# Patient Record
Sex: FEMALE | Race: WHITE | NOT HISPANIC OR LATINO
[De-identification: names, ages, dates, MRNs, and addresses within clinical notes are randomized per-mention and may not be internally consistent; named-entity substitution may affect disease eponyms.]

---

## 2019-10-30 ENCOUNTER — APPOINTMENT (OUTPATIENT)
Dept: ORTHOPEDIC SURGERY | Facility: CLINIC | Age: 54
End: 2019-10-30
Payer: COMMERCIAL

## 2019-10-30 VITALS — DIASTOLIC BLOOD PRESSURE: 81 MMHG | SYSTOLIC BLOOD PRESSURE: 132 MMHG | HEART RATE: 87 BPM

## 2019-10-30 VITALS — WEIGHT: 135 LBS | HEIGHT: 64 IN | BODY MASS INDEX: 23.05 KG/M2

## 2019-10-30 DIAGNOSIS — M75.82 OTHER SHOULDER LESIONS, LEFT SHOULDER: ICD-10-CM

## 2019-10-30 DIAGNOSIS — Z78.9 OTHER SPECIFIED HEALTH STATUS: ICD-10-CM

## 2019-10-30 PROCEDURE — 99203 OFFICE O/P NEW LOW 30 MIN: CPT

## 2019-10-30 PROCEDURE — 73030 X-RAY EXAM OF SHOULDER: CPT | Mod: LT

## 2019-10-30 NOTE — DISCUSSION/SUMMARY
[de-identified] : Discussion had with patient - will attempt conservative treatment \par Patient will follow up with sports medicine \par Patient aware must follow up with MD for further eval and treatment \par Patient will start Physical Therapy \par Patients questions were answered and patient is satisfied with today's visit\par

## 2019-10-30 NOTE — PHYSICAL EXAM
[UE/LE] : Sensory: Intact in bilateral upper & lower extremities [ALL] : dorsalis pedis, posterior tibial, femoral, popliteal, and radial 2+ and symmetric bilaterally [Normal] : Oriented to person, place, and time, insight and judgement were intact and the affect was normal [Poor Appearance] : well-appearing [Acute Distress] : not in acute distress [de-identified] : Skin Warm, dry, no erythema, no warmth, no swelling, no lesions\par \par Tenderness - posterior GH joint\par \par ROM \par Flexion -110 degrees active and passive\par Abduction 90 degrees active and passive \par \par provocative testing limited due to pain and Decreased ROM \par \par Sensation to touch intact to lateral to axillary and musculocutaneous nerve, distal motor function intact to elbow, wrist and hand \par pulse intact, cap refill intact\par  [de-identified] : 3 view left shoulder reveals no acute findings

## 2019-10-30 NOTE — HISTORY OF PRESENT ILLNESS
[Pain Location] : pain [de-identified] : Patient is a 54 year old female who presents c/o left shoulder pain.  patient states woke up on Saturday and was unable to lift arm.  patient states no injury that started pain. patient states was unable to lift arm upon waking. patient states started using naproxen with relief.  patient states ROM has improved however still decreased. patient states radiation down arm.

## 2019-11-03 PROBLEM — M75.82 TENDINITIS OF LEFT ROTATOR CUFF: Status: ACTIVE | Noted: 2019-10-30

## 2020-08-25 ENCOUNTER — OUTPATIENT (OUTPATIENT)
Dept: OUTPATIENT SERVICES | Facility: HOSPITAL | Age: 55
LOS: 1 days | End: 2020-08-25
Payer: COMMERCIAL

## 2020-08-25 ENCOUNTER — APPOINTMENT (OUTPATIENT)
Dept: ULTRASOUND IMAGING | Facility: IMAGING CENTER | Age: 55
End: 2020-08-25
Payer: COMMERCIAL

## 2020-08-25 ENCOUNTER — APPOINTMENT (OUTPATIENT)
Dept: ORTHOPEDIC SURGERY | Facility: CLINIC | Age: 55
End: 2020-08-25
Payer: COMMERCIAL

## 2020-08-25 VITALS — HEIGHT: 64 IN | BODY MASS INDEX: 23.56 KG/M2 | WEIGHT: 138 LBS

## 2020-08-25 DIAGNOSIS — M54.16 RADICULOPATHY, LUMBAR REGION: ICD-10-CM

## 2020-08-25 DIAGNOSIS — M79.605 PAIN IN LEFT LEG: ICD-10-CM

## 2020-08-25 DIAGNOSIS — M79.606 PAIN IN LEG, UNSPECIFIED: ICD-10-CM

## 2020-08-25 PROCEDURE — 93971 EXTREMITY STUDY: CPT

## 2020-08-25 PROCEDURE — 72100 X-RAY EXAM L-S SPINE 2/3 VWS: CPT

## 2020-08-25 PROCEDURE — 93971 EXTREMITY STUDY: CPT | Mod: 26,LT

## 2020-08-25 PROCEDURE — 99204 OFFICE O/P NEW MOD 45 MIN: CPT

## 2020-08-25 RX ORDER — MELOXICAM 15 MG/1
15 TABLET ORAL DAILY
Qty: 14 | Refills: 0 | Status: ACTIVE | COMMUNITY
Start: 2020-08-25 | End: 1900-01-01

## 2020-08-25 RX ORDER — CYCLOBENZAPRINE HYDROCHLORIDE 5 MG/1
5 TABLET, FILM COATED ORAL
Qty: 28 | Refills: 0 | Status: ACTIVE | COMMUNITY
Start: 2020-08-25 | End: 1900-01-01

## 2020-09-22 ENCOUNTER — EMERGENCY (EMERGENCY)
Facility: HOSPITAL | Age: 55
LOS: 1 days | Discharge: HOME | End: 2020-09-22
Attending: EMERGENCY MEDICINE
Payer: COMMERCIAL

## 2020-09-22 VITALS
RESPIRATION RATE: 17 BRPM | HEIGHT: 64 IN | HEART RATE: 79 BPM | OXYGEN SATURATION: 98 % | SYSTOLIC BLOOD PRESSURE: 129 MMHG | WEIGHT: 138.01 LBS | TEMPERATURE: 98 F | DIASTOLIC BLOOD PRESSURE: 89 MMHG

## 2020-09-22 VITALS
DIASTOLIC BLOOD PRESSURE: 85 MMHG | SYSTOLIC BLOOD PRESSURE: 111 MMHG | OXYGEN SATURATION: 98 % | HEART RATE: 76 BPM | RESPIRATION RATE: 18 BRPM

## 2020-09-22 PROCEDURE — 99283 EMERGENCY DEPT VISIT LOW MDM: CPT

## 2020-09-22 RX ORDER — IBUPROFEN 200 MG
600 TABLET ORAL ONCE
Refills: 0 | Status: COMPLETED | OUTPATIENT
Start: 2020-09-22 | End: 2020-09-22

## 2020-09-22 RX ADMIN — Medication 600 MILLIGRAM(S): at 21:03

## 2020-09-22 NOTE — ED PROVIDER NOTE - NSFOLLOWUPINSTRUCTIONS_ED_ALL_ED_FT
You were seen in the Emergency Room for a nose bleed.  You were suspected to have a nasal bone fracture and instructed to follow up with plastic surgery  Rest, drink plenty of fluids  Advance activity as tolerated  Continue all previously prescribed medications as directed  Follow up with your PMD - bring copies of your results  Return to the ER for difficulty breathing, chest pain, nose bleeding that does not resolve within 20 minutes of direct, uninterrupted pressure, or other new or concerning symptoms   If you experience pain, take Tylenol 650mg every six hours and supplement with ibuprofen 600mg, with food, every six hours which can be taken three hours apart from the Tylenol to have a layered effect.

## 2020-09-22 NOTE — ED PROVIDER NOTE - CARE PROVIDER_API CALL
Edouard Barnes (MD)  Plastic Surgery; Surgery  [address], [address]  [place] [address]  Phone: [phone]  Fax: [phone]  Follow Up Time:

## 2020-09-22 NOTE — ED PROVIDER NOTE - CARE PLAN
Principal Discharge DX:	Epistaxis due to trauma   Principal Discharge DX:	Contusion of nose, initial encounter  Secondary Diagnosis:	Epistaxis due to trauma

## 2020-09-22 NOTE — ED ADULT NURSE NOTE - OBJECTIVE STATEMENT
55 year old Female, comes to ER after walking into glass door around 1930, patient not on blood thinners, nose not bleeding at this time. Obvious bump, swelling and bruising to bridge of nose. Patient reports discomfort, no other complaints, patient is stable.

## 2020-09-22 NOTE — ED PROVIDER NOTE - CLINICAL SUMMARY MEDICAL DECISION MAKING FREE TEXT BOX
56yo female with no pmh presenting with nose bleed secondary to trauma.  Patient with edema, ecchymosis over nasal bridge.  Suspect nasal bone fracture, no septal hematoma noted.  Will give pain control and dc for outpatient follow up with plastics.

## 2020-09-22 NOTE — ED PROVIDER NOTE - PHYSICAL EXAMINATION
General appearance: NAD, conversant, afebrile    HENT: Ecchymosis, edema over nasal bridge, no septal hematoma appreciated   Neck: Trachea midline; Full range of motion   Pulm: normal respiratory effort and no intercostal retractions, normal work of breathing   Extremities: No peripheral edema    Skin: Dry, normal temperature, turgor and texture; no rash   Psych: Appropriate affect, cooperative; alert and oriented to person, place and time

## 2020-09-22 NOTE — ED PROVIDER NOTE - ATTENDING CONTRIBUTION TO CARE
Pt hit nose against glass door, resolved nose bleed, contusion of anterior nose.  No septal hematoma, no dyspnea, +soft tissue contusion of anterior midline nose, no bony td, no obvious deformity.  Discussed pros and cons of xray now vs delayed outpatient, pt agrees with outpatient xrays and plastics follow up 1 week afterwards once swelling resolves.

## 2020-09-22 NOTE — ED PROVIDER NOTE - OBJECTIVE STATEMENT
56yo female with no pmh presenting with nose bleed.  Patient walked into glass hitting bridge of nose.  Bleeding started immediately after and resolved in minutes.  Occurred around 730.  No loc, head trauma.  No ac use.

## 2020-10-06 ENCOUNTER — APPOINTMENT (OUTPATIENT)
Dept: ORTHOPEDIC SURGERY | Facility: CLINIC | Age: 55
End: 2020-10-06

## 2021-02-08 ENCOUNTER — TRANSCRIPTION ENCOUNTER (OUTPATIENT)
Age: 56
End: 2021-02-08

## 2022-05-20 ENCOUNTER — EMERGENCY (EMERGENCY)
Facility: HOSPITAL | Age: 57
LOS: 1 days | Discharge: HOME | End: 2022-05-20
Attending: EMERGENCY MEDICINE
Payer: COMMERCIAL

## 2022-05-20 VITALS
WEIGHT: 136.91 LBS | HEIGHT: 64 IN | RESPIRATION RATE: 19 BRPM | HEART RATE: 111 BPM | TEMPERATURE: 98 F | DIASTOLIC BLOOD PRESSURE: 91 MMHG | OXYGEN SATURATION: 99 % | SYSTOLIC BLOOD PRESSURE: 147 MMHG

## 2022-05-20 VITALS
DIASTOLIC BLOOD PRESSURE: 83 MMHG | RESPIRATION RATE: 18 BRPM | OXYGEN SATURATION: 100 % | HEART RATE: 91 BPM | SYSTOLIC BLOOD PRESSURE: 130 MMHG

## 2022-05-20 LAB
ALBUMIN SERPL ELPH-MCNC: 5.5 G/DL — HIGH (ref 3.3–5)
ALP SERPL-CCNC: 86 U/L — SIGNIFICANT CHANGE UP (ref 40–120)
ALT FLD-CCNC: 19 U/L — SIGNIFICANT CHANGE UP (ref 10–45)
ANION GAP SERPL CALC-SCNC: 12 MMOL/L — SIGNIFICANT CHANGE UP (ref 5–17)
AST SERPL-CCNC: 26 U/L — SIGNIFICANT CHANGE UP (ref 10–40)
BASOPHILS # BLD AUTO: 0.03 K/UL — SIGNIFICANT CHANGE UP (ref 0–0.2)
BASOPHILS NFR BLD AUTO: 0.5 % — SIGNIFICANT CHANGE UP (ref 0–2)
BILIRUB SERPL-MCNC: 0.4 MG/DL — SIGNIFICANT CHANGE UP (ref 0.2–1.2)
BUN SERPL-MCNC: 16 MG/DL — SIGNIFICANT CHANGE UP (ref 7–23)
CALCIUM SERPL-MCNC: 10.6 MG/DL — HIGH (ref 8.4–10.5)
CHLORIDE SERPL-SCNC: 99 MMOL/L — SIGNIFICANT CHANGE UP (ref 96–108)
CO2 SERPL-SCNC: 25 MMOL/L — SIGNIFICANT CHANGE UP (ref 22–31)
CREAT SERPL-MCNC: 0.7 MG/DL — SIGNIFICANT CHANGE UP (ref 0.5–1.3)
EGFR: 100 ML/MIN/1.73M2 — SIGNIFICANT CHANGE UP
EOSINOPHIL # BLD AUTO: 0.05 K/UL — SIGNIFICANT CHANGE UP (ref 0–0.5)
EOSINOPHIL NFR BLD AUTO: 0.9 % — SIGNIFICANT CHANGE UP (ref 0–6)
GLUCOSE SERPL-MCNC: 104 MG/DL — HIGH (ref 70–99)
HCT VFR BLD CALC: 40.7 % — SIGNIFICANT CHANGE UP (ref 34.5–45)
HGB BLD-MCNC: 13.9 G/DL — SIGNIFICANT CHANGE UP (ref 11.5–15.5)
IMM GRANULOCYTES NFR BLD AUTO: 0.3 % — SIGNIFICANT CHANGE UP (ref 0–1.5)
LYMPHOCYTES # BLD AUTO: 1.13 K/UL — SIGNIFICANT CHANGE UP (ref 1–3.3)
LYMPHOCYTES # BLD AUTO: 19.8 % — SIGNIFICANT CHANGE UP (ref 13–44)
MCHC RBC-ENTMCNC: 31.7 PG — SIGNIFICANT CHANGE UP (ref 27–34)
MCHC RBC-ENTMCNC: 34.2 GM/DL — SIGNIFICANT CHANGE UP (ref 32–36)
MCV RBC AUTO: 92.7 FL — SIGNIFICANT CHANGE UP (ref 80–100)
MONOCYTES # BLD AUTO: 0.38 K/UL — SIGNIFICANT CHANGE UP (ref 0–0.9)
MONOCYTES NFR BLD AUTO: 6.6 % — SIGNIFICANT CHANGE UP (ref 2–14)
NEUTROPHILS # BLD AUTO: 4.11 K/UL — SIGNIFICANT CHANGE UP (ref 1.8–7.4)
NEUTROPHILS NFR BLD AUTO: 71.9 % — SIGNIFICANT CHANGE UP (ref 43–77)
NRBC # BLD: 0 /100 WBCS — SIGNIFICANT CHANGE UP (ref 0–0)
PLATELET # BLD AUTO: 256 K/UL — SIGNIFICANT CHANGE UP (ref 150–400)
POTASSIUM SERPL-MCNC: 3.6 MMOL/L — SIGNIFICANT CHANGE UP (ref 3.5–5.3)
POTASSIUM SERPL-SCNC: 3.6 MMOL/L — SIGNIFICANT CHANGE UP (ref 3.5–5.3)
PROT SERPL-MCNC: 8.7 G/DL — HIGH (ref 6–8.3)
RBC # BLD: 4.39 M/UL — SIGNIFICANT CHANGE UP (ref 3.8–5.2)
RBC # FLD: 12.4 % — SIGNIFICANT CHANGE UP (ref 10.3–14.5)
SODIUM SERPL-SCNC: 136 MMOL/L — SIGNIFICANT CHANGE UP (ref 135–145)
WBC # BLD: 5.72 K/UL — SIGNIFICANT CHANGE UP (ref 3.8–10.5)
WBC # FLD AUTO: 5.72 K/UL — SIGNIFICANT CHANGE UP (ref 3.8–10.5)

## 2022-05-20 PROCEDURE — 99283 EMERGENCY DEPT VISIT LOW MDM: CPT

## 2022-05-20 PROCEDURE — 93005 ELECTROCARDIOGRAM TRACING: CPT

## 2022-05-20 PROCEDURE — 99284 EMERGENCY DEPT VISIT MOD MDM: CPT

## 2022-05-20 PROCEDURE — 85025 COMPLETE CBC W/AUTO DIFF WBC: CPT

## 2022-05-20 PROCEDURE — 93010 ELECTROCARDIOGRAM REPORT: CPT

## 2022-05-20 PROCEDURE — 36415 COLL VENOUS BLD VENIPUNCTURE: CPT

## 2022-05-20 PROCEDURE — 80053 COMPREHEN METABOLIC PANEL: CPT

## 2022-05-20 RX ORDER — SODIUM CHLORIDE 9 MG/ML
1000 INJECTION INTRAMUSCULAR; INTRAVENOUS; SUBCUTANEOUS ONCE
Refills: 0 | Status: COMPLETED | OUTPATIENT
Start: 2022-05-20 | End: 2022-05-20

## 2022-05-20 RX ADMIN — SODIUM CHLORIDE 1000 MILLILITER(S): 9 INJECTION INTRAMUSCULAR; INTRAVENOUS; SUBCUTANEOUS at 12:47

## 2022-05-20 NOTE — ED PROVIDER NOTE - PATIENT PORTAL LINK FT
You can access the FollowMyHealth Patient Portal offered by [facility] by registering at the following website: [email or web address]. By joining [employer]’s FollowMyHealth portal, you will also be able to view your health information using other applications (apps) compatible with our system.

## 2022-05-20 NOTE — ED PROVIDER NOTE - NSFOLLOWUPCLINICS_GEN_ALL_ED_FT
[facility]  Cardiology  [address] [place] [address]  Phone: [phone]  Fax:   Follow Up Time: 4-6 Days

## 2022-05-20 NOTE — ED ADULT NURSE NOTE - NS ED NURSE RECORD ANOTHER HT AND WT
Yes Complex Repair And Epidermal Autograft Text: The defect edges were debeveled with a #15 scalpel blade.  The primary defect was closed partially with a complex linear closure.  Given the location of the defect, shape of the defect and the proximity to free margins an epidermal autograft was deemed most appropriate to repair the remaining defect.  The graft was trimmed to fit the size of the remaining defect.  The graft was then placed in the primary defect, oriented appropriately, and sutured into place.

## 2022-05-20 NOTE — ED PROVIDER NOTE - OBJECTIVE STATEMENT
57F no sig PMH CC palpitations. PT notes was driving this AM and felt that her heart was racing, pt noted this has occurred to her in the past but notes that it has not lasted this long. This session lasted approx 2-3 hours, and noted left arm feeling cold, has had the cold arm before as well so not atypical. Does NOT endorse chest pain.   Currently in no acute distress, last known cards appt 1 year ago, unsure if had echo  No acs in immediate family

## 2022-05-20 NOTE — ED ADULT NURSE NOTE - OBJECTIVE STATEMENT
58 yo female presents to the ED AAOX4 complaining of palpitations since 10 am this morning. Pt denies SOB, CP. No PMH. Pt reports when she was at the park walking she felt her heart racing. Pt reports that she has had prior episodes of this before but they usually subsided with rest. Pt reports poor PO intake x 2 days. Had two glasses of wine last night, nothing to eat this morning. Pt breathing unlabored, spontaneous, and symmetrical. Lung sounds clear bl. Pt denies abd pain. Abd is soft, nontender, nondistended. Pt denies weakness in extremities, full ROM, ambulatory.  Pt denies lightheadedness, n/ v, fever, chills

## 2022-05-20 NOTE — ED PROVIDER NOTE - PHYSICAL EXAMINATION
GENERAL: Awake, alert, NAD  HEENT: NC/AT, moist mucous membranes, PERRL, EOMI  LUNGS: CTAB, no wheezes or crackles   CARDIAC: RRR, no m/r/g no reproducible chest pain   ABDOMEN: Soft, , non tender, non distended, no rebound, no guarding  BACK: No midline spinal tenderness, no CVA tenderness  EXT: No edema, no calf tenderness, 2+ DP pulses bilaterally, no deformities.  NEURO: A&Ox3. Moving all extremities.  SKIN: Warm and dry. No rash.  PSYCH: Normal affect. GENERAL: Awake, alert, NAD  HEENT: NC/AT, moist mucous membranes, PERRL, EOMI  LUNGS: CTAB, no wheezes or crackles   CARDIAC: RRR, no m/r/g no reproducible chest pain to palpation   ABDOMEN: Soft, , non tender, non distended, no rebound, no guarding  BACK: No midline spinal tenderness, no CVA tenderness  EXT: No edema, no calf tenderness, 2+ DP pulses bilaterally, no deformities.  NEURO: A&Ox3. Moving all extremities.  SKIN: Warm and dry. No rash.  PSYCH: Normal affect.

## 2022-05-20 NOTE — ED PROVIDER NOTE - CLINICAL SUMMARY MEDICAL DECISION MAKING FREE TEXT BOX
57F w/o sig pmh CC heart palpitations given hx and physical no concern at this time for acs will get basic labs and card f/u

## 2022-05-20 NOTE — ED PROVIDER NOTE - ATTENDING CONTRIBUTION TO CARE
I, Enrique Saunders, performed a history and physical exam of the patient and discussed their management with the resident and /or advanced care provider. I reviewed the resident and /or ACP's note and agree with the documented findings and plan of care. I was present and available for all procedures.  Patient with episode of palpitations that resolved. No sob, no dizziness, no lightheadedness, no chest pain. EKG wnl. Will evaluate basic labs and pt. likely requires outpatient holter monitor which will be arranged.

## 2022-05-20 NOTE — ED PROVIDER NOTE - NSFOLLOWUPINSTRUCTIONS_ED_ALL_ED_FT
Today you were seen in the ED for chest palpitations    It was found that you have no signs of a medical emergency on toady's work up     Please follow up with your cardiologist, if you do not have one you can reach one at    [address] [place] [address] 4th floor   [place], [place]   [phone]  General Cardiology       Please return to the ED if you have more severe chest pain or the pain that brought you into the Emergency Dept. returns, an increase in shortness of breath, nausea and / or vomiting associated with your chest pain.

## 2022-05-20 NOTE — ED PROVIDER NOTE - PROGRESS NOTE DETAILS
Discussed results with patient in regards to their lab work and / or imaging.   pt understands need to f/u with cards for possible echo vs holter  Kina PGY 1 no symptoms since being in hospital

## 2022-07-22 ENCOUNTER — OUTPATIENT (OUTPATIENT)
Dept: OUTPATIENT SERVICES | Facility: HOSPITAL | Age: 57
LOS: 1 days | End: 2022-07-22
Payer: COMMERCIAL

## 2022-07-22 ENCOUNTER — APPOINTMENT (OUTPATIENT)
Dept: CT IMAGING | Facility: CLINIC | Age: 57
End: 2022-07-22

## 2022-07-22 DIAGNOSIS — R00.2 PALPITATIONS: ICD-10-CM

## 2022-07-22 PROCEDURE — 75574 CT ANGIO HRT W/3D IMAGE: CPT | Mod: 26

## 2022-07-22 PROCEDURE — 75574 CT ANGIO HRT W/3D IMAGE: CPT

## 2022-09-09 NOTE — ED PROVIDER NOTE - PATIENT PORTAL LINK FT
You can access the FollowMyHealth Patient Portal offered by [facility] by registering at the following website: [email or web address]. By joining [employer]’s FollowMyHealth portal, you will also be able to view your health information using other applications (apps) compatible with our system.
77

## 2024-11-28 ENCOUNTER — EMERGENCY (EMERGENCY)
Facility: HOSPITAL | Age: 59
LOS: 1 days | Discharge: HOME | End: 2024-11-28
Attending: EMERGENCY MEDICINE
Payer: COMMERCIAL

## 2024-11-28 VITALS
HEIGHT: 64 IN | WEIGHT: 128.97 LBS | SYSTOLIC BLOOD PRESSURE: 139 MMHG | OXYGEN SATURATION: 98 % | TEMPERATURE: 98 F | DIASTOLIC BLOOD PRESSURE: 83 MMHG | HEART RATE: 95 BPM | RESPIRATION RATE: 18 BRPM

## 2024-11-28 PROCEDURE — 99285 EMERGENCY DEPT VISIT HI MDM: CPT

## 2024-11-28 NOTE — ED ADULT TRIAGE NOTE - CHIEF COMPLAINT QUOTE
palpitations xfew hours a/w nausea, no vomiting  reports HR to 120s on apple watch  denies dizziness/syncope

## 2024-11-29 VITALS
DIASTOLIC BLOOD PRESSURE: 72 MMHG | OXYGEN SATURATION: 98 % | TEMPERATURE: 98 F | HEART RATE: 74 BPM | RESPIRATION RATE: 16 BRPM | SYSTOLIC BLOOD PRESSURE: 117 MMHG

## 2024-11-29 LAB
ALBUMIN SERPL ELPH-MCNC: 4.5 G/DL — SIGNIFICANT CHANGE UP (ref 3.3–5)
ALP SERPL-CCNC: 81 U/L — SIGNIFICANT CHANGE UP (ref 40–120)
ALT FLD-CCNC: 24 U/L — SIGNIFICANT CHANGE UP (ref 10–45)
ANION GAP SERPL CALC-SCNC: 11 MMOL/L — SIGNIFICANT CHANGE UP (ref 5–17)
AST SERPL-CCNC: 24 U/L — SIGNIFICANT CHANGE UP (ref 10–40)
BASOPHILS # BLD AUTO: 0.04 K/UL — SIGNIFICANT CHANGE UP (ref 0–0.2)
BASOPHILS NFR BLD AUTO: 0.7 % — SIGNIFICANT CHANGE UP (ref 0–2)
BILIRUB SERPL-MCNC: 0.2 MG/DL — SIGNIFICANT CHANGE UP (ref 0.2–1.2)
BUN SERPL-MCNC: 31 MG/DL — HIGH (ref 7–23)
CALCIUM SERPL-MCNC: 10.1 MG/DL — SIGNIFICANT CHANGE UP (ref 8.4–10.5)
CHLORIDE SERPL-SCNC: 101 MMOL/L — SIGNIFICANT CHANGE UP (ref 96–108)
CO2 SERPL-SCNC: 26 MMOL/L — SIGNIFICANT CHANGE UP (ref 22–31)
CREAT SERPL-MCNC: 0.78 MG/DL — SIGNIFICANT CHANGE UP (ref 0.5–1.3)
EGFR: 86 ML/MIN/1.73M2 — SIGNIFICANT CHANGE UP
EOSINOPHIL # BLD AUTO: 0.07 K/UL — SIGNIFICANT CHANGE UP (ref 0–0.5)
EOSINOPHIL NFR BLD AUTO: 1.3 % — SIGNIFICANT CHANGE UP (ref 0–6)
GLUCOSE SERPL-MCNC: 111 MG/DL — HIGH (ref 70–99)
HCT VFR BLD CALC: 36.6 % — SIGNIFICANT CHANGE UP (ref 34.5–45)
HGB BLD-MCNC: 12 G/DL — SIGNIFICANT CHANGE UP (ref 11.5–15.5)
IMM GRANULOCYTES NFR BLD AUTO: 0.2 % — SIGNIFICANT CHANGE UP (ref 0–0.9)
LIDOCAIN IGE QN: 49 U/L — SIGNIFICANT CHANGE UP (ref 7–60)
LYMPHOCYTES # BLD AUTO: 1.1 K/UL — SIGNIFICANT CHANGE UP (ref 1–3.3)
LYMPHOCYTES # BLD AUTO: 20.4 % — SIGNIFICANT CHANGE UP (ref 13–44)
MAGNESIUM SERPL-MCNC: 2.1 MG/DL — SIGNIFICANT CHANGE UP (ref 1.6–2.6)
MCHC RBC-ENTMCNC: 31.2 PG — SIGNIFICANT CHANGE UP (ref 27–34)
MCHC RBC-ENTMCNC: 32.8 G/DL — SIGNIFICANT CHANGE UP (ref 32–36)
MCV RBC AUTO: 95.1 FL — SIGNIFICANT CHANGE UP (ref 80–100)
MONOCYTES # BLD AUTO: 0.41 K/UL — SIGNIFICANT CHANGE UP (ref 0–0.9)
MONOCYTES NFR BLD AUTO: 7.6 % — SIGNIFICANT CHANGE UP (ref 2–14)
NEUTROPHILS # BLD AUTO: 3.77 K/UL — SIGNIFICANT CHANGE UP (ref 1.8–7.4)
NEUTROPHILS NFR BLD AUTO: 69.8 % — SIGNIFICANT CHANGE UP (ref 43–77)
NRBC # BLD: 0 /100 WBCS — SIGNIFICANT CHANGE UP (ref 0–0)
NT-PROBNP SERPL-SCNC: <36 PG/ML — SIGNIFICANT CHANGE UP (ref 0–300)
PLATELET # BLD AUTO: 248 K/UL — SIGNIFICANT CHANGE UP (ref 150–400)
POTASSIUM SERPL-MCNC: 4.4 MMOL/L — SIGNIFICANT CHANGE UP (ref 3.5–5.3)
POTASSIUM SERPL-SCNC: 4.4 MMOL/L — SIGNIFICANT CHANGE UP (ref 3.5–5.3)
PROT SERPL-MCNC: 7.7 G/DL — SIGNIFICANT CHANGE UP (ref 6–8.3)
RBC # BLD: 3.85 M/UL — SIGNIFICANT CHANGE UP (ref 3.8–5.2)
RBC # FLD: 12.9 % — SIGNIFICANT CHANGE UP (ref 10.3–14.5)
SODIUM SERPL-SCNC: 138 MMOL/L — SIGNIFICANT CHANGE UP (ref 135–145)
T3 SERPL-MCNC: 88 NG/DL — SIGNIFICANT CHANGE UP (ref 80–200)
T4 AB SER-ACNC: 6.5 UG/DL — SIGNIFICANT CHANGE UP (ref 4.6–12)
TROPONIN T, HIGH SENSITIVITY RESULT: 7 NG/L — SIGNIFICANT CHANGE UP (ref 0–51)
TSH SERPL-MCNC: 3.09 UIU/ML — SIGNIFICANT CHANGE UP (ref 0.27–4.2)
WBC # BLD: 5.4 K/UL — SIGNIFICANT CHANGE UP (ref 3.8–10.5)
WBC # FLD AUTO: 5.4 K/UL — SIGNIFICANT CHANGE UP (ref 3.8–10.5)

## 2024-11-29 PROCEDURE — 83690 ASSAY OF LIPASE: CPT

## 2024-11-29 PROCEDURE — 84484 ASSAY OF TROPONIN QUANT: CPT

## 2024-11-29 PROCEDURE — 83735 ASSAY OF MAGNESIUM: CPT

## 2024-11-29 PROCEDURE — 99285 EMERGENCY DEPT VISIT HI MDM: CPT | Mod: 25

## 2024-11-29 PROCEDURE — 83880 ASSAY OF NATRIURETIC PEPTIDE: CPT

## 2024-11-29 PROCEDURE — 84480 ASSAY TRIIODOTHYRONINE (T3): CPT

## 2024-11-29 PROCEDURE — 36000 PLACE NEEDLE IN VEIN: CPT

## 2024-11-29 PROCEDURE — 84443 ASSAY THYROID STIM HORMONE: CPT

## 2024-11-29 PROCEDURE — 80053 COMPREHEN METABOLIC PANEL: CPT

## 2024-11-29 PROCEDURE — 93005 ELECTROCARDIOGRAM TRACING: CPT

## 2024-11-29 PROCEDURE — 71046 X-RAY EXAM CHEST 2 VIEWS: CPT

## 2024-11-29 PROCEDURE — 71046 X-RAY EXAM CHEST 2 VIEWS: CPT | Mod: 26

## 2024-11-29 PROCEDURE — 84436 ASSAY OF TOTAL THYROXINE: CPT

## 2024-11-29 PROCEDURE — 85025 COMPLETE CBC W/AUTO DIFF WBC: CPT

## 2024-11-29 RX ORDER — 0.9 % SODIUM CHLORIDE 0.9 %
1000 INTRAVENOUS SOLUTION INTRAVENOUS ONCE
Refills: 0 | Status: COMPLETED | OUTPATIENT
Start: 2024-11-29 | End: 2024-11-29

## 2024-11-29 RX ADMIN — Medication 2000 MILLILITER(S): at 00:52

## 2024-11-29 NOTE — ED PROVIDER NOTE - ATTENDING CONTRIBUTION TO CARE
------------ATTENDING NOTE------------  pt w/ daughter c/o feeling unwell after eating, had gradual onset of nausea, heart rate increase to 120s, mild vague chest/epigastric ache, lasting over an hour, describes overeating tonight and drinking 2 glasses wine over evening/night, h/o anxiety but different than panic attack, slight improved w/ Xanax -- no current chest pain/discomfort or sob/dyspnea or palpitations, clear chest w/o distress, nml cardiac exam/equal distal pulses, no edema/calf tenderness, soft abd w/o mass/organomegaly, no rash, awaiting labs/imaging and close reassessments -->  - Darek Carbajal MD   ----------------------------------------------------------- ------------ATTENDING NOTE------------  pt w/ daughter c/o feeling unwell after eating, had gradual onset of nausea, heart rate increase to 120s, mild vague chest/epigastric ache, lasting over an hour, describes overeating tonight and drinking 2 glasses wine over evening/night, h/o anxiety but different than panic attack, slight improved w/ Xanax -- no current chest pain/discomfort or sob/dyspnea or palpitations, clear chest w/o distress, nml cardiac exam/equal distal pulses, no edema/calf tenderness, soft abd w/o mass/organomegaly, no rash, awaiting labs/imaging and close reassessments --> labs wnl, remained NSR, benign repeat exams, tolerating PO, in depth dw all about ddx, tx, hoyos, continued close outpt fu.  - Darek Carbajal MD   -----------------------------------------------------------

## 2024-11-29 NOTE — ED PROVIDER NOTE - PROGRESS NOTE DETAILS
Anton Chun MD: Lab and imaging results reviewed and communicated to the patient. She does not require admission due to nonactionable results, is tolerating PO intake, and feels better. She will follow up with her cardiologist.

## 2024-11-29 NOTE — ED ADULT NURSE NOTE - NSFALLUNIVINTERV_ED_ALL_ED
Bed/Stretcher in lowest position, wheels locked, appropriate side rails in place/Call bell, personal items and telephone in reach/Instruct patient to call for assistance before getting out of bed/chair/stretcher/Non-slip footwear applied when patient is off stretcher/[place] to call system/Physically safe environment - no spills, clutter or unnecessary equipment/Purposeful proactive rounding/Room/bathroom lighting operational, light cord in reach

## 2024-11-29 NOTE — ED PROVIDER NOTE - OBJECTIVE STATEMENT
59-year-old female with past medical history of anemia and anxiety presents to the emergency department with general malaise after eating associated with palpitations and elevated heart rate. She felt vague chest and epigastric pain for just over an hour which is now resolved on exam. She has no shortness of breath or palpitations now.

## 2024-11-29 NOTE — ED ADULT NURSE NOTE - OBJECTIVE STATEMENT
59yoF PMH Anxiety presents to ED with nausea. Patient reports she was eating thanksgiving dinner when suddenly began feeling flush and experienced palpitations. Patient had smartwatch indicating HR in 120s. Patient felt nausea but no vomitting. 59yoF PMH Anxiety presents to ED with nausea. Patient reports she was eating thanksgiving dinner when suddenly began feeling flush and experienced palpitations. Patient had smartwatch indicating HR in 120s. Patient felt nausea but no vomitting, reports feeling malaise and generally unwell. Patient reports improvement in symptoms, asymptomatic upon evaluation, HR 80s, NSR. Patient reports experiencing constipation previous 2 weeks, has not taken medication for constipation, was able to move bowels normally following episode. Patient also reports taking xanax with improvement in symptoms. Patient has had anxiety attacks in the past, states this does not feel that way. Patient drank 3 glasses of wine prior to episode. Patient normally very active, walks 4+ miles per day. Denies chest pain, SOB, urinary symptoms, fevers, chills, falls, injuries, dizziness, diaphoresis, confusion, LOC.

## 2024-11-29 NOTE — ED PROVIDER NOTE - CLINICAL SUMMARY MEDICAL DECISION MAKING FREE TEXT BOX
see MD Note The patient's risk factors for ACS were reviewed as well as the initial EKG.  A chest x-ray has been ordered to exclude pneumonia, pneumothorax, or esophageal tears.  The patient does not require CT angiogram to rule out a pulmonary embolism based on the Wells Score and/or PERC rule. There are no concerning features of history of exam that are strongly suggestive of pericarditis, endocarditis, or myocarditis. There is no fever.  There does not appear to be an aortic dissection either based on history or physical exam. Will obtain serial EKG's if needed for evolving symptoms, an initial troponin, maintain on cardiac monitor, reassess. Thyroid stimulating hormone also ordered.    Labs and imaging do not show explanatory cause for patient's symptoms. Given her low absolute risk for acute coronary syndrome, she may follow up outpatient with her doctor for further testing.

## 2024-11-29 NOTE — ED PROVIDER NOTE - NSFOLLOWUPINSTRUCTIONS_ED_ALL_ED_FT
You were seen in the emergency department for chest pain. We have evaluated you and determined that you do not require further hospital interventions.    During your stay you had the following relevant results: an EKG and troponin (blood test) that do not show evidence of a heart attack, a chest X-ray that does not show evidence of a lung infection    Please follow up with a CARDIOLOGIST to discuss the results of your stay in our department.    If you start to experience worsening symptoms such as persistent chest pain, nausea or vomiting, difficulty breathing, please return to the emergency department for further evaluation. You were seen in the emergency department for chest pain. We have evaluated you and determined that you do not require further hospital interventions.    During your stay you had the following relevant results: an EKG and troponin (blood test) that do not show evidence of a heart attack, a chest X-ray that does not show evidence of a lung infection    See CHEST PAIN and PALPITATIONS information and return instructions given to you.    Please follow up with a CARDIOLOGIST to discuss the results of your stay in our department.    If you start to experience worsening symptoms such as persistent chest pain, nausea or vomiting, difficulty breathing, please return to the emergency department for further evaluation.

## 2024-11-29 NOTE — ED PROVIDER NOTE - PATIENT PORTAL LINK FT
You can access the FollowMyHealth Patient Portal offered by [facility] by registering at the following website: [email or web address]. By joining [employer]’s FollowMyHealth portal, you will also be able to view your health information using other applications (apps) compatible with our system.

## 2025-02-13 ENCOUNTER — NON-APPOINTMENT (OUTPATIENT)
Age: 60
End: 2025-02-13

## 2025-02-21 ENCOUNTER — OFFICE (OUTPATIENT)
Dept: URBAN - METROPOLITAN AREA CLINIC 27 | Facility: CLINIC | Age: 60
Setting detail: OPHTHALMOLOGY
End: 2025-02-21
Payer: COMMERCIAL

## 2025-02-21 DIAGNOSIS — H01.002: ICD-10-CM

## 2025-02-21 DIAGNOSIS — H01.005: ICD-10-CM

## 2025-02-21 DIAGNOSIS — H02.89: ICD-10-CM

## 2025-02-21 DIAGNOSIS — H01.004: ICD-10-CM

## 2025-02-21 DIAGNOSIS — H52.4: ICD-10-CM

## 2025-02-21 DIAGNOSIS — H25.13: ICD-10-CM

## 2025-02-21 DIAGNOSIS — H16.223: ICD-10-CM

## 2025-02-21 DIAGNOSIS — H01.001: ICD-10-CM

## 2025-02-21 PROBLEM — H52.7 REFRACTIVE ERROR ; BOTH EYES: Status: ACTIVE | Noted: 2025-02-21

## 2025-02-21 PROCEDURE — 92015 DETERMINE REFRACTIVE STATE: CPT | Performed by: OPTOMETRIST

## 2025-02-21 PROCEDURE — BRUDER EYE BRUDER EYE PADS: Performed by: OPTOMETRIST

## 2025-02-21 PROCEDURE — 92004 COMPRE OPH EXAM NEW PT 1/>: CPT | Performed by: OPTOMETRIST

## 2025-02-21 ASSESSMENT — LID EXAM ASSESSMENTS
OD_BLEPHARITIS: 2+
OS_MEIBOMITIS: 2+
OS_BLEPHARITIS: 2+
OD_MEIBOMITIS: 2+

## 2025-02-21 ASSESSMENT — CONFRONTATIONAL VISUAL FIELD TEST (CVF)
OS_FINDINGS: FULL
OD_FINDINGS: FULL

## 2025-02-21 ASSESSMENT — TONOMETRY
OS_IOP_MMHG: 16
OD_IOP_MMHG: 18

## 2025-02-22 ASSESSMENT — REFRACTION_CURRENTRX
OS_AXIS: 107
OD_CYLINDER: +1.00
OD_AXIS: 064
OS_OVR_VA: 20/
OD_SPHERE: +3.00
OS_CYLINDER: +0.75
OD_OVR_VA: 20/
OS_SPHERE: +3.25

## 2025-02-22 ASSESSMENT — REFRACTION_AUTOREFRACTION
OD_CYLINDER: +0.75
OD_AXIS: 060
OD_SPHERE: +1.75
OS_SPHERE: +2.25
OS_CYLINDER: +0.75
OS_AXIS: 145

## 2025-02-22 ASSESSMENT — REFRACTION_MANIFEST
OS_CYLINDER: +0.50
OD_CYLINDER: +0.50
OS_SPHERE: +1.25
OD_AXIS: 62
OD_ADD: +1.75
OS_ADD: +1.75
OD_SPHERE: +1.00
OS_AXIS: 145
OD_VA1: 20/15
OS_VA1: 20/15

## 2025-02-22 ASSESSMENT — KERATOMETRY
OD_K2POWER_DIOPTERS: 48.00
OS_AXISANGLE_DEGREES: 096
OS_K2POWER_DIOPTERS: 48.00
OD_K1POWER_DIOPTERS: 46.75
OS_K1POWER_DIOPTERS: 47.75
OD_AXISANGLE_DEGREES: 079

## 2025-02-22 ASSESSMENT — VISUAL ACUITY
OS_BCVA: 20/50+1
OD_BCVA: 20/40-1

## 2025-03-05 NOTE — ED ADULT NURSE NOTE - SUICIDE SCREENING QUESTION 2
danger to self; mental illness expected to respond to inpatient care danger to self; mental illness expected to respond to inpatient care danger to self; mental illness expected to respond to inpatient care danger to self; mental illness expected to respond to inpatient care danger to self; mental illness expected to respond to inpatient care danger to self; mental illness expected to respond to inpatient care danger to self; mental illness expected to respond to inpatient care danger to self; mental illness expected to respond to inpatient care No danger to self; mental illness expected to respond to inpatient care danger to self; mental illness expected to respond to inpatient care

## 2025-03-21 ENCOUNTER — OFFICE (OUTPATIENT)
Dept: URBAN - METROPOLITAN AREA CLINIC 27 | Facility: CLINIC | Age: 60
Setting detail: OPHTHALMOLOGY
End: 2025-03-21
Payer: COMMERCIAL

## 2025-03-21 ENCOUNTER — RX ONLY (RX ONLY)
Age: 60
End: 2025-03-21

## 2025-03-21 DIAGNOSIS — H01.001: ICD-10-CM

## 2025-03-21 DIAGNOSIS — H16.223: ICD-10-CM

## 2025-03-21 DIAGNOSIS — H01.004: ICD-10-CM

## 2025-03-21 DIAGNOSIS — H25.13: ICD-10-CM

## 2025-03-21 PROCEDURE — 99213 OFFICE O/P EST LOW 20 MIN: CPT | Performed by: OPTOMETRIST

## 2025-03-21 ASSESSMENT — REFRACTION_MANIFEST
OS_AXIS: 145
OD_CYLINDER: +0.50
OD_VA1: 20/15
OD_SPHERE: +1.00
OD_AXIS: 62
OD_ADD: +1.75
OS_VA1: 20/15
OS_CYLINDER: +0.50
OS_SPHERE: +1.25
OS_ADD: +1.75

## 2025-03-21 ASSESSMENT — KERATOMETRY
OD_AXISANGLE_DEGREES: 076
OD_K1POWER_DIOPTERS: 47.00
OS_K2POWER_DIOPTERS: 48.00
OS_AXISANGLE_DEGREES: 118
OD_K2POWER_DIOPTERS: 48.25
OS_K1POWER_DIOPTERS: 47.75

## 2025-03-21 ASSESSMENT — REFRACTION_AUTOREFRACTION
OS_SPHERE: +2.00
OS_AXIS: 146
OD_SPHERE: +1.50
OD_CYLINDER: +1.00
OD_AXIS: 062
OS_CYLINDER: +1.00

## 2025-03-21 ASSESSMENT — VISUAL ACUITY
OS_BCVA: 20/50
OD_BCVA: 20/40

## 2025-03-21 ASSESSMENT — TONOMETRY
OS_IOP_MMHG: 13
OD_IOP_MMHG: 13

## 2025-03-21 ASSESSMENT — REFRACTION_CURRENTRX
OD_SPHERE: +3.00
OD_AXIS: 064
OS_AXIS: 107
OS_OVR_VA: 20/
OD_CYLINDER: +1.00
OS_SPHERE: +3.25
OS_CYLINDER: +0.75
OD_OVR_VA: 20/

## 2025-03-21 ASSESSMENT — LID EXAM ASSESSMENTS
OS_BLEPHARITIS: LUL 2+
OD_COMMENTS: TELANGIECTASIA
OS_COMMENTS: TELANGIECTASIA
OD_BLEPHARITIS: RUL 2+